# Patient Record
Sex: FEMALE | Race: ASIAN | NOT HISPANIC OR LATINO | URBAN - METROPOLITAN AREA
[De-identification: names, ages, dates, MRNs, and addresses within clinical notes are randomized per-mention and may not be internally consistent; named-entity substitution may affect disease eponyms.]

---

## 2020-02-14 ENCOUNTER — EMERGENCY (EMERGENCY)
Facility: HOSPITAL | Age: 30
LOS: 0 days | Discharge: HOME | End: 2020-02-14
Attending: EMERGENCY MEDICINE | Admitting: EMERGENCY MEDICINE
Payer: COMMERCIAL

## 2020-02-14 VITALS
WEIGHT: 111.99 LBS | SYSTOLIC BLOOD PRESSURE: 106 MMHG | DIASTOLIC BLOOD PRESSURE: 59 MMHG | RESPIRATION RATE: 18 BRPM | HEART RATE: 102 BPM | TEMPERATURE: 98 F | OXYGEN SATURATION: 99 %

## 2020-02-14 VITALS — HEART RATE: 84 BPM

## 2020-02-14 DIAGNOSIS — R10.2 PELVIC AND PERINEAL PAIN: ICD-10-CM

## 2020-02-14 DIAGNOSIS — R55 SYNCOPE AND COLLAPSE: ICD-10-CM
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
## 2020-02-14 DIAGNOSIS — M54.5 LOW BACK PAIN: ICD-10-CM
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
## 2020-02-14 PROCEDURE — 71046 X-RAY EXAM CHEST 2 VIEWS: CPT | Mod: 26

## 2020-02-14 PROCEDURE — 93010 ELECTROCARDIOGRAM REPORT: CPT

## 2020-02-14 PROCEDURE — 99285 EMERGENCY DEPT VISIT HI MDM: CPT | Mod: 25

## 2020-02-14 PROCEDURE — 93308 TTE F-UP OR LMTD: CPT | Mod: 26

## 2020-02-14 PROCEDURE — 73522 X-RAY EXAM HIPS BI 3-4 VIEWS: CPT | Mod: 26

## 2020-02-14 PROCEDURE — 76705 ECHO EXAM OF ABDOMEN: CPT | Mod: 26

## 2020-02-14 RX ORDER — IBUPROFEN 200 MG
600 TABLET ORAL ONCE
Refills: 0 | Status: COMPLETED | OUTPATIENT
Start: 2020-02-14 | End: 2020-02-14

## 2020-02-14 RX ADMIN — Medication 600 MILLIGRAM(S): at 10:02

## 2020-02-14 NOTE — ED PROVIDER NOTE - ATTENDING CONTRIBUTION TO CARE
29yo healthy woman presented with low back pain s/p fall vs syncope. Pt reports that she was sleeping, got up to go to the bathroom and fell, not sure if she fainted or was just not quite awake. Pt able to get up and has been ambulatory with some discomfort. No HA, chest pain, SOB, abd pain, nausea, vomiting. No urinary sx. On exam she is well appearing, NCAT, lungs CTA, CVS1S2 RRR abd soft, ,NT. Back exam significant for tenderness over the b/l paraspinal lumbar region and sacrum, no midline tenderness. Doubt true syncope, also doubt spinal injury, will check XR, FAST, EKG, Upreg, reassess.

## 2020-02-14 NOTE — ED ADULT TRIAGE NOTE - CHIEF COMPLAINT QUOTE
"fell down in bathroom, stilly sleepy" + LOC, hit head on wall. no blood thinners. pt A&O4 in triage c/o back pain

## 2020-02-14 NOTE — ED PROVIDER NOTE - OBJECTIVE STATEMENT
30F no PMH p/w b/l pelvic pain s/p fall, syncope. Pt walked to bathroom, felt tired after waking up and fell. Doesn't remember fall. no CP, SOB, palpitations, nausea, visual changes. Pt woke up on floor and endorsed lower back pain. Pt ambulated since. No other sxs currently. This never happened before.

## 2020-02-14 NOTE — ED PROVIDER NOTE - PHYSICAL EXAMINATION
Constitutional: Well developed, well nourished. NAD. Good general hygiene  Head: Atraumatic. No stepoffs or tenderness.  Eyes: PERRLA. EOMI without discomfort.   ENT: No nasal discharge. Mucous membranes moist. No hemotympanum.  Neck: Supple. Painless ROM.  Cardiovascular: Regular rhythm. Regular rate. Normal S1 and S2. No murmurs. 2+ pulses in all extremities.   Pulmonary: Normal respiratory rate and effort. Lungs clear to auscultation bilaterally. No wheezing, rales, or rhonchi. Bilateral, equal lung expansion.   Abdominal: Soft. Nondistended. Nontender. No rebound or guarding.   Back: B/l sacral tenderness. No obvious deformities. No stepoffs.  Extremities. Pelvis stable. No lower extremity edema. Symmetric calves.  Skin: No rashes.   Neuro: AAOx3. CN 2-12, 5/5 strength in all extremities, sensation equal and intact in all extremities, no dysmetria, no pronator drift. Ambulating in ED w/ discomfort.

## 2020-02-14 NOTE — ED PROVIDER NOTE - PATIENT PORTAL LINK FT
You can access the FollowMyHealth Patient Portal offered by [facility] by registering at the following website: [email or web address]. By joining [employer]’s FollowMyHealth portal, you will also be able to view your health information using other applications (apps) compatible with our system.

## 2020-02-14 NOTE — ED PROVIDER NOTE - CARE PROVIDER_API CALL
Renetta Flores)  Cardiology; Internal Medicine; Nuclear Cardiology  [address] [place] [address], [place] [address]  Phone: [phone]  Fax: [phone]  Follow Up Time: 1-3 Days

## 2020-02-14 NOTE — ED ADULT NURSE NOTE - OBJECTIVE STATEMENT
Pt presents s/p Fall in bathroom this morning. Pt states she couldn't sleep last night-slept 3hrs on couch then woke up to walk to bathroom and does not remember anything after including cause of fall. Pt denies taking any medications, feeling SOB, CP, dizzy before fall.

## 2020-02-14 NOTE — ED ADULT NURSE NOTE - NSIMPLEMENTINTERV_GEN_ALL_ED
Implemented All Fall with Harm Risk Interventions:  [place] to call system. Call bell, personal items and telephone within reach. Instruct patient to call for assistance. Room bathroom lighting operational. Non-slip footwear when patient is off stretcher. Physically safe environment: no spills, clutter or unnecessary equipment. Stretcher in lowest position, wheels locked, appropriate side rails in place. Provide visual cue, wrist band, yellow gown, etc. Monitor gait and stability. Monitor for mental status changes and reorient to person, place, and time. Review medications for side effects contributing to fall risk. Reinforce activity limits and safety measures with patient and family. Provide visual clues: red socks.

## 2020-02-14 NOTE — ED PROVIDER NOTE - PROGRESS NOTE DETAILS
A: 30F no PMH p/w b/l pelvic pain since falling this AM. Walked to bathroom, felt tired and syncopised. Woke up on floor and ambulated with pain. Never happened before, no prodrome, CP, SOB. Exam unremarkable for deformity. FAST neg. EKG unremarkable. Pending upreg AA: XR unremarkable, will dc. Will give cardio f/u. return precautions given for lightheadedness, weakness, CP, SOB, palpitaitons, syncope.

## 2020-02-14 NOTE — ED PROVIDER NOTE - NS ED ROS FT
General: No fever, chills, or weakness.  Eyes:  No visual changes, eye pain or discharge.  ENMT:  No hearing changes, pain, no sore throat or runny nose, no difficulty swallowing  Cardiac:  No chest pain, SOB or edema. No chest pain with exertion.  Respiratory:  No cough or respiratory distress. No hemoptysis. No history of asthma or RAD.  GI:  No nausea, vomiting, diarrhea or abdominal pain.  :  No dysuria, frequency or burning.  MS: Lower back pain. No other myalgias.  Neuro: Syncope. No headache.  No LOC. No dizziness.  Skin:  No skin rash.

## 2020-02-14 NOTE — ED PROVIDER NOTE - CLINICAL SUMMARY MEDICAL DECISION MAKING FREE TEXT BOX
FAST neg, EKG WNL, XR no fx. Pt much improved and ambulating after NSAIDs. Will d/c with f/u and return to the ED for persistent or recurring sx.

## 2024-08-16 NOTE — ED PROVIDER NOTE - NS ED MD DISPO DISCHARGE CCDA
Study completed by Veronika on 08/09/2024   Patient/Caregiver provided printed discharge information.